# Patient Record
Sex: FEMALE | HISPANIC OR LATINO | URBAN - METROPOLITAN AREA
[De-identification: names, ages, dates, MRNs, and addresses within clinical notes are randomized per-mention and may not be internally consistent; named-entity substitution may affect disease eponyms.]

---

## 2018-06-07 NOTE — PATIENT DISCUSSION
F/u in 2-3 weeks; discussed that if no improvement, will likely need I&D on follow up. Pt declines I&D today.

## 2018-06-26 NOTE — PROCEDURE NOTE: CLINICAL
PROCEDURE NOTE: Chalazion Injection Left Lower Lid. Diagnosis: Chalazion. Prior to treatment, the risks/benefits/alternatives were discussed. The patient wished to proceed with procedure. Site of Injection: *. Kenalog *units injected. Patient tolerated procedure well. There were no complications. Post procedure instructions given. Cynthia Cutting PROCEDURE NOTE: Excision Chalazion, Single Left Lower Lid. Diagnosis: Chalazion. Prior to treatment, the risks/benefits/alternatives were discussed. The patient wished to proceed with procedure. After the risks, benefits, and alternatives to the procedure were discussed with the patient, informed consent was obtained. The patient, the procedure, and the correct site were identified beforehand. Local anesthesia was applied and the lid was prepped. The lid was clamped exposing the posterior surface on the eyelid. The chalazion was incised and removed with a curette. Bleeding was controlled and the clamp was removed. The patient tolerated the procedure well and left the room in good condition. Post procedure instructions given. Cynthia Cutting

## 2022-01-13 ENCOUNTER — IMPORTED ENCOUNTER (OUTPATIENT)
Dept: URBAN - METROPOLITAN AREA CLINIC 31 | Facility: CLINIC | Age: 72
End: 2022-01-13

## 2022-01-13 PROBLEM — H02.413: Noted: 2022-01-13

## 2022-01-13 PROBLEM — E11.9: Noted: 2022-01-13

## 2022-01-13 PROBLEM — H25.813: Noted: 2022-01-13

## 2022-01-13 PROCEDURE — 92015 DETERMINE REFRACTIVE STATE: CPT

## 2022-01-13 PROCEDURE — 99204 OFFICE O/P NEW MOD 45 MIN: CPT

## 2022-01-13 NOTE — PATIENT DISCUSSION
1. DM II without sign of Diabetic Retinopathy OU:  Discussed the pathophysiology of diabetes and its effect on the eye. Stressed the importance of regular followup and good control of BS BP and Lipids to avoid future complications. 2. Combined Types of Cataract OU: Explained how cataracts can effect vision. Recommend clinical observation. The patient was advised to contact us if any change or worsening of vision. 3. Mechanical Ptosis OU --  The patient has droopy upper eyelids. According to the patient and/or compared to old photographs of the patient the condition is old and stable. Therefore observation was recommended. 1 year CE

## 2022-04-02 ASSESSMENT — VISUAL ACUITY
OS_SC: 20/25
OS_CC: 20/80-2
OS_CC: J1+
OD_CC: J1+
OU_SC: J3
OD_SC: 20/25-2
OD_SC: J3
OU_CC: J1+16''
OD_CC: 20/100-2
OS_SC: J3

## 2022-04-02 ASSESSMENT — TONOMETRY
OD_IOP_MMHG: 14
OS_IOP_MMHG: 15

## 2022-07-09 ENCOUNTER — TELEPHONE ENCOUNTER (OUTPATIENT)
Dept: URBAN - METROPOLITAN AREA CLINIC 121 | Facility: CLINIC | Age: 72
End: 2022-07-09

## 2022-07-10 ENCOUNTER — TELEPHONE ENCOUNTER (OUTPATIENT)
Dept: URBAN - METROPOLITAN AREA CLINIC 121 | Facility: CLINIC | Age: 72
End: 2022-07-10

## 2022-07-10 RX ORDER — ALPRAZOLAM 1 MG/1
TABLET ORAL TWICE A DAY
Refills: 0 | Status: ACTIVE | COMMUNITY
Start: 2017-04-13

## 2022-07-10 RX ORDER — FAMOTIDINE 10 MG
TABLET ORAL
Refills: 0 | Status: ACTIVE | COMMUNITY
Start: 2017-04-13

## 2022-12-06 ENCOUNTER — P2P PATIENT RECORD (OUTPATIENT)
Age: 72
End: 2022-12-06

## 2022-12-07 ENCOUNTER — TELEPHONE ENCOUNTER (OUTPATIENT)
Dept: URBAN - METROPOLITAN AREA CLINIC 63 | Facility: CLINIC | Age: 72
End: 2022-12-07

## 2023-08-22 ENCOUNTER — OFFICE VISIT (OUTPATIENT)
Dept: URBAN - METROPOLITAN AREA CLINIC 60 | Facility: CLINIC | Age: 73
End: 2023-08-22
Payer: COMMERCIAL

## 2023-08-22 ENCOUNTER — LAB OUTSIDE AN ENCOUNTER (OUTPATIENT)
Dept: URBAN - METROPOLITAN AREA CLINIC 60 | Facility: CLINIC | Age: 73
End: 2023-08-22

## 2023-08-22 ENCOUNTER — WEB ENCOUNTER (OUTPATIENT)
Dept: URBAN - METROPOLITAN AREA CLINIC 60 | Facility: CLINIC | Age: 73
End: 2023-08-22

## 2023-08-22 VITALS
OXYGEN SATURATION: 95 % | WEIGHT: 133 LBS | DIASTOLIC BLOOD PRESSURE: 78 MMHG | HEART RATE: 59 BPM | SYSTOLIC BLOOD PRESSURE: 120 MMHG | BODY MASS INDEX: 25.11 KG/M2 | RESPIRATION RATE: 20 BRPM | HEIGHT: 61 IN | TEMPERATURE: 96.3 F

## 2023-08-22 DIAGNOSIS — R13.14 PHARYNGOESOPHAGEAL DYSPHAGIA: ICD-10-CM

## 2023-08-22 DIAGNOSIS — K29.60 REFLUX GASTRITIS: ICD-10-CM

## 2023-08-22 DIAGNOSIS — Z12.11 COLON CANCER SCREENING: ICD-10-CM

## 2023-08-22 PROBLEM — 40739000: Status: ACTIVE | Noted: 2023-08-22

## 2023-08-22 PROCEDURE — 99204 OFFICE O/P NEW MOD 45 MIN: CPT | Performed by: NURSE PRACTITIONER

## 2023-08-22 RX ORDER — ROSUVASTATIN CALCIUM 20 MG/1
1 TABLET TABLET, COATED ORAL ONCE A DAY
Status: ACTIVE | COMMUNITY

## 2023-08-22 RX ORDER — FAMOTIDINE 40 MG/1
1 TABLET AT BEDTIME AS NEEDED TABLET, FILM COATED ORAL ONCE A DAY
Qty: 30 | Refills: 2 | OUTPATIENT
Start: 2023-08-22

## 2023-08-22 RX ORDER — ALPRAZOLAM 1 MG/1
TABLET ORAL TWICE A DAY
Refills: 0 | Status: ACTIVE | COMMUNITY
Start: 2017-04-13

## 2023-08-22 RX ORDER — LISINOPRIL AND HYDROCHLOROTHIAZIDE 12.5; 2 MG/1; MG/1
TABLET ORAL
Qty: 90 TABLET | Refills: 1 | Status: ACTIVE | COMMUNITY

## 2023-08-22 NOTE — HPI-TODAY'S VISIT:
Patient is here today referred by her primary doctor for screening colonoscopy.  Patient never had colonoscopy done, denies any personal or family history of colorectal cancer, rectal bleeding, or change in her bowel habits, negative for weight loss. Positive for chronic constipation. Patient also complaining of having symptom of gastritis and reflux.  Her symptoms are chronic but now seems to be worse.  Patient will start on diet and treatment with famotidine 40 mg once a day, EGD, colonoscopy.

## 2023-08-29 ENCOUNTER — LAB OUTSIDE AN ENCOUNTER (OUTPATIENT)
Dept: URBAN - METROPOLITAN AREA CLINIC 60 | Facility: CLINIC | Age: 73
End: 2023-08-29

## 2023-10-13 ENCOUNTER — CLAIMS CREATED FROM THE CLAIM WINDOW (OUTPATIENT)
Dept: URBAN - METROPOLITAN AREA CLINIC 4 | Facility: CLINIC | Age: 73
End: 2023-10-13
Payer: COMMERCIAL

## 2023-10-13 ENCOUNTER — CLAIMS CREATED FROM THE CLAIM WINDOW (OUTPATIENT)
Dept: URBAN - METROPOLITAN AREA SURGERY CENTER 4 | Facility: SURGERY CENTER | Age: 73
End: 2023-10-13
Payer: COMMERCIAL

## 2023-10-13 DIAGNOSIS — K29.70 GASTRITIS WITHOUT BLEEDING, UNSPECIFIED CHRONICITY, UNSPECIFIED GASTRITIS TYPE: ICD-10-CM

## 2023-10-13 DIAGNOSIS — K22.89 OTHER SPECIFIED DISEASE OF ESOPHAGUS: ICD-10-CM

## 2023-10-13 DIAGNOSIS — K21.9 GASTRO-ESOPHAGEAL REFLUX DISEASE WITHOUT ESOPHAGITIS: ICD-10-CM

## 2023-10-13 DIAGNOSIS — K21.9 ESOPHAGEAL REFLUX: ICD-10-CM

## 2023-10-13 DIAGNOSIS — K31.89 OTHER DISEASES OF STOMACH AND DUODENUM: ICD-10-CM

## 2023-10-13 DIAGNOSIS — K31.7 GASTRIC POLYPS: ICD-10-CM

## 2023-10-13 DIAGNOSIS — Z12.11 COLON CANCER SCREENING (HIGH RISK): ICD-10-CM

## 2023-10-13 DIAGNOSIS — K31.7 POLYP OF STOMACH AND DUODENUM: ICD-10-CM

## 2023-10-13 DIAGNOSIS — K29.70 CHRONIC GASTRITIS: ICD-10-CM

## 2023-10-13 PROCEDURE — 43239 EGD BIOPSY SINGLE/MULTIPLE: CPT | Performed by: INTERNAL MEDICINE

## 2023-10-13 PROCEDURE — 88312 SPECIAL STAINS GROUP 1: CPT | Performed by: PATHOLOGY

## 2023-10-13 PROCEDURE — 88305 TISSUE EXAM BY PATHOLOGIST: CPT | Performed by: PATHOLOGY

## 2023-10-13 PROCEDURE — 00731 ANES UPR GI NDSC PX NOS: CPT | Performed by: NURSE ANESTHETIST, CERTIFIED REGISTERED

## 2023-10-13 PROCEDURE — 88342 IMHCHEM/IMCYTCHM 1ST ANTB: CPT | Performed by: PATHOLOGY

## 2023-10-13 PROCEDURE — 99100 ANES PT EXTEME AGE<1 YR&>70: CPT | Performed by: NURSE ANESTHETIST, CERTIFIED REGISTERED

## 2023-10-13 RX ORDER — ALPRAZOLAM 1 MG/1
TABLET ORAL TWICE A DAY
Refills: 0 | Status: ACTIVE | COMMUNITY
Start: 2017-04-13

## 2023-10-13 RX ORDER — ROSUVASTATIN CALCIUM 20 MG/1
1 TABLET TABLET, COATED ORAL ONCE A DAY
Status: ACTIVE | COMMUNITY

## 2023-10-13 RX ORDER — LISINOPRIL AND HYDROCHLOROTHIAZIDE 12.5; 2 MG/1; MG/1
TABLET ORAL
Qty: 90 TABLET | Refills: 1 | Status: ACTIVE | COMMUNITY

## 2023-10-13 RX ORDER — FAMOTIDINE 40 MG/1
1 TABLET AT BEDTIME AS NEEDED TABLET, FILM COATED ORAL ONCE A DAY
Qty: 30 | Refills: 2 | Status: ACTIVE | COMMUNITY
Start: 2023-08-22

## 2023-10-24 ENCOUNTER — OFFICE VISIT (OUTPATIENT)
Dept: URBAN - METROPOLITAN AREA CLINIC 60 | Facility: CLINIC | Age: 73
End: 2023-10-24

## 2023-10-24 RX ORDER — LISINOPRIL AND HYDROCHLOROTHIAZIDE 12.5; 2 MG/1; MG/1
TABLET ORAL
Qty: 90 TABLET | Refills: 1 | Status: ACTIVE | COMMUNITY

## 2023-10-24 RX ORDER — ALPRAZOLAM 1 MG/1
TABLET ORAL TWICE A DAY
Refills: 0 | Status: ACTIVE | COMMUNITY
Start: 2017-04-13

## 2023-10-24 RX ORDER — ROSUVASTATIN CALCIUM 20 MG/1
1 TABLET TABLET, COATED ORAL ONCE A DAY
Status: ACTIVE | COMMUNITY

## 2023-10-24 RX ORDER — FAMOTIDINE 40 MG/1
1 TABLET AT BEDTIME AS NEEDED TABLET, FILM COATED ORAL ONCE A DAY
Qty: 30 | Refills: 2 | Status: ACTIVE | COMMUNITY
Start: 2023-08-22

## 2024-01-18 ENCOUNTER — LAB OUTSIDE AN ENCOUNTER (OUTPATIENT)
Dept: URBAN - METROPOLITAN AREA SURGERY CENTER 4 | Facility: SURGERY CENTER | Age: 74
End: 2024-01-18

## 2024-01-19 ENCOUNTER — OFFICE VISIT (OUTPATIENT)
Dept: URBAN - METROPOLITAN AREA SURGERY CENTER 4 | Facility: SURGERY CENTER | Age: 74
End: 2024-01-19
Payer: COMMERCIAL

## 2024-01-19 ENCOUNTER — TELEPHONE ENCOUNTER (OUTPATIENT)
Dept: URBAN - METROPOLITAN AREA CLINIC 63 | Facility: CLINIC | Age: 74
End: 2024-01-19

## 2024-01-19 ENCOUNTER — CLAIMS CREATED FROM THE CLAIM WINDOW (OUTPATIENT)
Dept: URBAN - METROPOLITAN AREA CLINIC 4 | Facility: CLINIC | Age: 74
End: 2024-01-19
Payer: COMMERCIAL

## 2024-01-19 DIAGNOSIS — Z12.11 ENCOUNTER FOR SCREENING FOR MALIGNANT NEOPLASM OF COLON: ICD-10-CM

## 2024-01-19 DIAGNOSIS — D17.5 LIPOMA OF COLON: ICD-10-CM

## 2024-01-19 DIAGNOSIS — Z87.19 PERSONAL HISTORY OF OTHER DISEASES OF THE DIGESTIVE SYSTEM: ICD-10-CM

## 2024-01-19 DIAGNOSIS — K64.1 SECOND DEGREE HEMORRHOIDS: ICD-10-CM

## 2024-01-19 DIAGNOSIS — Z12.11 COLON CANCER SCREENING (HIGH RISK): ICD-10-CM

## 2024-01-19 DIAGNOSIS — K63.5 POLYP OF TRANSVERSE COLON, UNSPECIFIED TYPE: ICD-10-CM

## 2024-01-19 DIAGNOSIS — K62.1 RECTAL POLYP: ICD-10-CM

## 2024-01-19 DIAGNOSIS — Z86.010 ADENOMAS PERSONAL HISTORY OF COLONIC POLYPS: ICD-10-CM

## 2024-01-19 DIAGNOSIS — K63.5 BENIGN COLON POLYPS: ICD-10-CM

## 2024-01-19 DIAGNOSIS — K63.89 OTHER SPECIFIED DISEASES OF INTESTINE: ICD-10-CM

## 2024-01-19 PROCEDURE — 45380 COLONOSCOPY AND BIOPSY: CPT | Performed by: INTERNAL MEDICINE

## 2024-01-19 PROCEDURE — 99100 ANES PT EXTEME AGE<1 YR&>70: CPT | Performed by: NURSE ANESTHETIST, CERTIFIED REGISTERED

## 2024-01-19 PROCEDURE — 88305 TISSUE EXAM BY PATHOLOGIST: CPT | Performed by: PATHOLOGY

## 2024-01-19 PROCEDURE — 00811 ANES LWR INTST NDSC NOS: CPT | Performed by: NURSE ANESTHETIST, CERTIFIED REGISTERED

## 2024-01-19 RX ORDER — FAMOTIDINE 40 MG/1
1 TABLET AT BEDTIME AS NEEDED TABLET, FILM COATED ORAL ONCE A DAY
Qty: 30 | Refills: 2 | Status: ACTIVE | COMMUNITY
Start: 2023-08-22

## 2024-01-19 RX ORDER — ALPRAZOLAM 1 MG/1
TABLET ORAL TWICE A DAY
Refills: 0 | Status: ACTIVE | COMMUNITY
Start: 2017-04-13

## 2024-01-19 RX ORDER — LISINOPRIL AND HYDROCHLOROTHIAZIDE 12.5; 2 MG/1; MG/1
TABLET ORAL
Qty: 90 TABLET | Refills: 1 | Status: ACTIVE | COMMUNITY

## 2024-01-19 RX ORDER — ROSUVASTATIN CALCIUM 20 MG/1
1 TABLET TABLET, COATED ORAL ONCE A DAY
Status: ACTIVE | COMMUNITY

## 2024-01-26 ENCOUNTER — TELEPHONE ENCOUNTER (OUTPATIENT)
Dept: URBAN - METROPOLITAN AREA CLINIC 64 | Facility: CLINIC | Age: 74
End: 2024-01-26

## 2024-01-30 ENCOUNTER — ERX REFILL RESPONSE (OUTPATIENT)
Dept: URBAN - METROPOLITAN AREA CLINIC 63 | Facility: CLINIC | Age: 74
End: 2024-01-30

## 2024-01-30 ENCOUNTER — OFFICE VISIT (OUTPATIENT)
Dept: URBAN - METROPOLITAN AREA CLINIC 63 | Facility: CLINIC | Age: 74
End: 2024-01-30
Payer: COMMERCIAL

## 2024-01-30 VITALS
DIASTOLIC BLOOD PRESSURE: 60 MMHG | SYSTOLIC BLOOD PRESSURE: 110 MMHG | OXYGEN SATURATION: 93 % | HEIGHT: 61 IN | HEART RATE: 64 BPM | TEMPERATURE: 93.4 F | WEIGHT: 135 LBS | BODY MASS INDEX: 25.49 KG/M2

## 2024-01-30 DIAGNOSIS — A04.8 H. PYLORI INFECTION: ICD-10-CM

## 2024-01-30 DIAGNOSIS — K63.5 POLYP OF COLON, UNSPECIFIED PART OF COLON, UNSPECIFIED TYPE: ICD-10-CM

## 2024-01-30 DIAGNOSIS — K64.8 INTERNAL HEMORRHOIDS: ICD-10-CM

## 2024-01-30 DIAGNOSIS — K29.60 REFLUX GASTRITIS: ICD-10-CM

## 2024-01-30 PROCEDURE — 99214 OFFICE O/P EST MOD 30 MIN: CPT | Performed by: INTERNAL MEDICINE

## 2024-01-30 RX ORDER — OMEPRAZOLE 20 MG/1
1 CAPSULE 30 MINUTES BEFORE MORNING MEAL AND BEFORE DINNER CAPSULE, DELAYED RELEASE ORAL TWICE A DAY
Qty: 60 | Refills: 1 | OUTPATIENT

## 2024-01-30 RX ORDER — ALPRAZOLAM 1 MG/1
TABLET ORAL TWICE A DAY
Refills: 0 | Status: ACTIVE | COMMUNITY
Start: 2017-04-13

## 2024-01-30 RX ORDER — CLARITHROMYCIN 500 MG/1
1 TABLET TABLET, FILM COATED ORAL
Qty: 28 TABLET | OUTPATIENT
Start: 2024-01-30 | End: 2024-02-13

## 2024-01-30 RX ORDER — OMEPRAZOLE 20 MG/1
1 CAPSULE 30 MINUTES BEFORE MORNING MEAL CAPSULE, DELAYED RELEASE ORAL TWICE A DAY
Qty: 28 CAPSULE | OUTPATIENT

## 2024-01-30 RX ORDER — AMOXICILLIN 500 MG/1
2 CAPSULES CAPSULE ORAL TWICE A DAY
Qty: 56 CAPSULE | OUTPATIENT
Start: 2024-01-30 | End: 2024-02-13

## 2024-01-30 RX ORDER — OMEPRAZOLE 20 MG/1
1 CAPSULE 30 MINUTES BEFORE MORNING MEAL CAPSULE, DELAYED RELEASE ORAL TWICE A DAY
Qty: 28 CAPSULE | OUTPATIENT
Start: 2024-01-30

## 2024-01-30 RX ORDER — LISINOPRIL AND HYDROCHLOROTHIAZIDE 12.5; 2 MG/1; MG/1
TABLET ORAL
Qty: 90 TABLET | Refills: 1 | Status: ACTIVE | COMMUNITY

## 2024-01-30 RX ORDER — ROSUVASTATIN CALCIUM 20 MG/1
1 TABLET TABLET, COATED ORAL ONCE A DAY
Status: ACTIVE | COMMUNITY

## 2024-01-30 RX ORDER — FAMOTIDINE 40 MG/1
1 TABLET AT BEDTIME AS NEEDED TABLET, FILM COATED ORAL ONCE A DAY
Qty: 30 | Refills: 2 | Status: ACTIVE | COMMUNITY
Start: 2023-08-22

## 2024-01-30 NOTE — HPI-TODAY'S VISIT:
Patient is here today referred by her primary doctor for screening colonoscopy.  Patient never had colonoscopy done, denies any personal or family history of colorectal cancer, rectal bleeding, or change in her bowel habits, negative for weight loss. Positive for chronic constipation. Patient also complaining of having symptom of gastritis and reflux.  Her symptoms are chronic but now seems to be worse.  Patient will start on diet and treatment with famotidine 40 mg once a day, EGD, colonoscopy. 1/24: Patient had an upper endoscopy and colonoscopy in October 2023.  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  Colonoscopy with colonic polyps 5 mm polyp in the transverse colon 5 mm polyp in the descending colon and a 5 mm polyp in the rectum all of them removed with cold biopsy forcep, medium sized lipoma in the transverse colon and internal hemorrhoids pathology report showed normal duodenum with chronic gastritis positive for H. pylori distal esophagus showed squamous columnar mucosa with reflux type negative for Chaidez's esophagus, negative for dysplasia or malignancy.  Pathology from the polyps removed all of them were hyperplastic and benign polyps was recommended to repeat a colonoscopy in 5 years.  Will do triple treatment and will follow the patient in 2 months and will check the status of H. pylori.

## 2024-04-03 ENCOUNTER — OV EP (OUTPATIENT)
Dept: URBAN - METROPOLITAN AREA CLINIC 63 | Facility: CLINIC | Age: 74
End: 2024-04-03
Payer: COMMERCIAL

## 2024-04-03 VITALS
BODY MASS INDEX: 25.3 KG/M2 | SYSTOLIC BLOOD PRESSURE: 110 MMHG | WEIGHT: 134 LBS | OXYGEN SATURATION: 92 % | HEIGHT: 61 IN | HEART RATE: 61 BPM | DIASTOLIC BLOOD PRESSURE: 80 MMHG | TEMPERATURE: 96.1 F

## 2024-04-03 DIAGNOSIS — K59.09 CHRONIC CONSTIPATION: ICD-10-CM

## 2024-04-03 DIAGNOSIS — A04.8 H. PYLORI INFECTION: ICD-10-CM

## 2024-04-03 DIAGNOSIS — Z86.010 PERSONAL HISTORY OF COLONIC POLYPS: ICD-10-CM

## 2024-04-03 DIAGNOSIS — K29.60 REFLUX GASTRITIS: ICD-10-CM

## 2024-04-03 PROCEDURE — 99214 OFFICE O/P EST MOD 30 MIN: CPT | Performed by: INTERNAL MEDICINE

## 2024-04-03 RX ORDER — LISINOPRIL AND HYDROCHLOROTHIAZIDE 12.5; 2 MG/1; MG/1
TABLET ORAL
Qty: 90 TABLET | Refills: 1 | Status: ACTIVE | COMMUNITY

## 2024-04-03 RX ORDER — ROSUVASTATIN CALCIUM 20 MG/1
1 TABLET TABLET, COATED ORAL ONCE A DAY
Status: ACTIVE | COMMUNITY

## 2024-04-03 RX ORDER — LINACLOTIDE 72 UG/1
1 CAPSULE AT LEAST 30 MINUTES BEFORE THE FIRST MEAL OF THE DAY ON AN EMPTY STOMACH CAPSULE, GELATIN COATED ORAL ONCE A DAY
Qty: 30 | OUTPATIENT
Start: 2024-04-03 | End: 2024-05-03

## 2024-04-03 RX ORDER — OMEPRAZOLE 20 MG/1
1 CAPSULE 30 MINUTES BEFORE MORNING MEAL AND BEFORE DINNER ORALLY TWICE A DAY 30 DAYS CAPSULE, DELAYED RELEASE ORAL
Qty: 180 CAPSULE | Refills: 1 | Status: ACTIVE | COMMUNITY

## 2024-04-03 RX ORDER — FAMOTIDINE 40 MG/1
1 TABLET AT BEDTIME AS NEEDED TABLET, FILM COATED ORAL ONCE A DAY
Qty: 30 | Refills: 2 | Status: ACTIVE | COMMUNITY
Start: 2023-08-22

## 2024-04-03 RX ORDER — ALPRAZOLAM 1 MG/1
TABLET ORAL TWICE A DAY
Refills: 0 | Status: ACTIVE | COMMUNITY
Start: 2017-04-13

## 2024-04-03 NOTE — HPI-TODAY'S VISIT:
Patient is here today referred by her primary doctor for screening colonoscopy.  Patient never had colonoscopy done, denies any personal or family history of colorectal cancer, rectal bleeding, or change in her bowel habits, negative for weight loss. Positive for chronic constipation. Patient also complaining of having symptom of gastritis and reflux.  Her symptoms are chronic but now seems to be worse.  Patient will start on diet and treatment with famotidine 40 mg once a day, EGD, colonoscopy. 1/24: Patient had an upper endoscopy and colonoscopy in October 2023.  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  Colonoscopy with colonic polyps 5 mm polyp in the transverse colon 5 mm polyp in the descending colon and a 5 mm polyp in the rectum all of them removed with cold biopsy forcep, medium sized lipoma in the transverse colon and internal hemorrhoids pathology report showed normal duodenum with chronic gastritis positive for H. pylori distal esophagus showed squamous columnar mucosa with reflux type negative for Chaidez's esophagus, negative for dysplasia or malignancy.  Pathology from the polyps removed all of them were hyperplastic and benign polyps was recommended to repeat a colonoscopy in 5 years.  Will do triple treatment and will follow the patient in 2 months and will check the status of H. pylori. 4/24; Patient is being seen after treatment for H. pylori, patient tolerated the treatment well, will check status of H. pylori we will do urea breath test, patient will call back for results, if they are positive we will need to see her back at the office to adjust treatment.  Otherwise we will follow-up as a scheduled in the past.  She will need to have a repeat colonoscopy in 5 years.  And has per distal esophagus was evidence of acid reflux negative for Chaidez's esophagus, at that time we could repeat an endoscopy if there is any upper GI symptoms.

## 2024-04-03 NOTE — PHYSICAL EXAM GASTROINTESTINAL
Abdomen , soft, nontender, nondistended , no guarding or rigidity , no masses palpable , normal bowel sounds , Liver and Spleen,  no hepatosplenomegaly , liver nontender Natalie DC. Request already responded to by other means (e.g. phone or fax)   Refill Authorization Note   Sheri Broderick  is requesting a refill authorization.  Brief Assessment and Rationale for Refill:  Quick Discontinue  Medication Therapy Plan:       Medication Reconciliation Completed:  No      Comments:     Note composed:11:38 AM 05/16/2022

## 2024-07-31 ENCOUNTER — DASHBOARD ENCOUNTERS (OUTPATIENT)
Age: 74
End: 2024-07-31

## 2024-07-31 ENCOUNTER — OFFICE VISIT (OUTPATIENT)
Dept: URBAN - METROPOLITAN AREA CLINIC 63 | Facility: CLINIC | Age: 74
End: 2024-07-31

## 2024-07-31 VITALS
SYSTOLIC BLOOD PRESSURE: 142 MMHG | WEIGHT: 132 LBS | TEMPERATURE: 97.3 F | OXYGEN SATURATION: 92 % | HEART RATE: 66 BPM | HEIGHT: 61 IN | BODY MASS INDEX: 24.92 KG/M2 | DIASTOLIC BLOOD PRESSURE: 90 MMHG

## 2024-07-31 RX ORDER — FAMOTIDINE 40 MG/1
1 TABLET AT BEDTIME AS NEEDED TABLET, FILM COATED ORAL ONCE A DAY
Qty: 30 | Refills: 2 | Status: ACTIVE | COMMUNITY
Start: 2023-08-22

## 2024-07-31 RX ORDER — LISINOPRIL AND HYDROCHLOROTHIAZIDE 12.5; 2 MG/1; MG/1
TABLET ORAL
Qty: 90 TABLET | Refills: 1 | Status: ACTIVE | COMMUNITY

## 2024-07-31 RX ORDER — OMEPRAZOLE 20 MG/1
1 CAPSULE 30 MINUTES BEFORE MORNING MEAL AND BEFORE DINNER ORALLY TWICE A DAY 30 DAYS CAPSULE, DELAYED RELEASE ORAL
Qty: 180 CAPSULE | Refills: 1 | Status: ACTIVE | COMMUNITY

## 2024-07-31 RX ORDER — ROSUVASTATIN CALCIUM 20 MG/1
1 TABLET TABLET, COATED ORAL ONCE A DAY
Status: ACTIVE | COMMUNITY

## 2024-07-31 RX ORDER — ALPRAZOLAM 1 MG/1
TABLET ORAL TWICE A DAY
Refills: 0 | Status: ACTIVE | COMMUNITY
Start: 2017-04-13

## 2024-07-31 NOTE — HPI-TODAY'S VISIT:
Patient is here today referred by her primary doctor for screening colonoscopy.  Patient never had colonoscopy done, denies any personal or family history of colorectal cancer, rectal bleeding, or change in her bowel habits, negative for weight loss. Positive for chronic constipation. Patient also complaining of having symptom of gastritis and reflux.  Her symptoms are chronic but now seems to be worse.  Patient will start on diet and treatment with famotidine 40 mg once a day, EGD, colonoscopy. : Patient had an upper endoscopy and colonoscopy in 2023.  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  Colonoscopy with colonic polyps 5 mm polyp in the transverse colon 5 mm polyp in the descending colon and a 5 mm polyp in the rectum all of them removed with cold biopsy forcep, medium sized lipoma in the transverse colon and internal hemorrhoids pathology report showed normal duodenum with chronic gastritis positive for H. pylori distal esophagus showed squamous columnar mucosa with reflux type negative for Chaidez's esophagus, negative for dysplasia or malignancy.  Pathology from the polyps removed all of them were hyperplastic and benign polyps was recommended to repeat a colonoscopy in 5 years.  Will do triple treatment and will follow the patient in 2 months and will check the status of H. pylori. ; Patient is being seen after treatment for H. pylori, patient tolerated the treatment well, will check status of H. pylori we will do urea breath test, patient will call back for results, if they are positive we will need to see her back at the office to adjust treatment.  Otherwise we will follow-up as a scheduled in the past.  She will need to have a repeat colonoscopy in 5 years.  And has per distal esophagus was evidence of acid reflux negative for Chaidez's esophagus, at that time we could repeat an endoscopy if there is any upper GI symptoms. : Patient had recent labs with evidence of elevated T  and gucose. A1C is 6.9. LFT are normal. Patient with  constipation will start with miralax  daily and will do urea breath test and will follow in 4 months.

## 2024-12-11 ENCOUNTER — OFFICE VISIT (OUTPATIENT)
Dept: URBAN - METROPOLITAN AREA CLINIC 63 | Facility: CLINIC | Age: 74
End: 2024-12-11
Payer: COMMERCIAL

## 2024-12-11 VITALS
SYSTOLIC BLOOD PRESSURE: 144 MMHG | TEMPERATURE: 97.3 F | WEIGHT: 137 LBS | BODY MASS INDEX: 25.86 KG/M2 | DIASTOLIC BLOOD PRESSURE: 87 MMHG | HEIGHT: 61 IN | HEART RATE: 70 BPM | OXYGEN SATURATION: 98 %

## 2024-12-11 DIAGNOSIS — K29.60 REFLUX GASTRITIS: ICD-10-CM

## 2024-12-11 DIAGNOSIS — E11.9 TYPE 2 DIABETES MELLITUS WITHOUT COMPLICATION, WITHOUT LONG-TERM CURRENT USE OF INSULIN: ICD-10-CM

## 2024-12-11 DIAGNOSIS — K59.09 CHRONIC CONSTIPATION: ICD-10-CM

## 2024-12-11 DIAGNOSIS — G62.9 NEUROPATHY: ICD-10-CM

## 2024-12-11 DIAGNOSIS — A04.8 H. PYLORI INFECTION: ICD-10-CM

## 2024-12-11 PROBLEM — 313436004: Status: ACTIVE | Noted: 2024-12-11

## 2024-12-11 PROCEDURE — 99214 OFFICE O/P EST MOD 30 MIN: CPT | Performed by: INTERNAL MEDICINE

## 2024-12-11 RX ORDER — OMEPRAZOLE 20 MG/1
1 CAPSULE 30 MINUTES BEFORE MORNING MEAL AND BEFORE DINNER ORALLY TWICE A DAY 30 DAYS CAPSULE, DELAYED RELEASE ORAL
Qty: 180 CAPSULE | Refills: 1 | Status: ACTIVE | COMMUNITY

## 2024-12-11 RX ORDER — GABAPENTIN 300 MG/1
CAPSULE ORAL
Qty: 90 APPLICATOR | Refills: 1 | Status: ACTIVE | COMMUNITY

## 2024-12-11 RX ORDER — LISINOPRIL AND HYDROCHLOROTHIAZIDE 12.5; 2 MG/1; MG/1
TABLET ORAL
Qty: 90 TABLET | Refills: 1 | Status: ACTIVE | COMMUNITY

## 2024-12-11 RX ORDER — ALPRAZOLAM 1 MG/1
TABLET ORAL TWICE A DAY
Refills: 0 | Status: ACTIVE | COMMUNITY
Start: 2017-04-13

## 2024-12-11 RX ORDER — FAMOTIDINE 40 MG/1
1 TABLET AT BEDTIME AS NEEDED TABLET, FILM COATED ORAL ONCE A DAY
Qty: 30 | Refills: 2 | Status: ACTIVE | COMMUNITY
Start: 2023-08-22

## 2024-12-11 RX ORDER — ROSUVASTATIN CALCIUM 20 MG/1
1 TABLET TABLET, COATED ORAL ONCE A DAY
Status: ACTIVE | COMMUNITY

## 2024-12-11 RX ORDER — GABAPENTIN 300 MG/1
CAPSULE ORAL
Qty: 90 APPLICATOR | Refills: 1

## 2024-12-11 NOTE — HPI-TODAY'S VISIT:
Patient is here today referred by her primary doctor for screening colonoscopy.  Patient never had colonoscopy done, denies any personal or family history of colorectal cancer, rectal bleeding, or change in her bowel habits, negative for weight loss. Positive for chronic constipation. Patient also complaining of having symptom of gastritis and reflux.  Her symptoms are chronic but now seems to be worse.  Patient will start on diet and treatment with famotidine 40 mg once a day, EGD, colonoscopy. : Patient had an upper endoscopy and colonoscopy in 2023.  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  Colonoscopy with colonic polyps 5 mm polyp in the transverse colon 5 mm polyp in the descending colon and a 5 mm polyp in the rectum all of them removed with cold biopsy forcep, medium sized lipoma in the transverse colon and internal hemorrhoids pathology report showed normal duodenum with chronic gastritis positive for H. pylori distal esophagus showed squamous columnar mucosa with reflux type negative for Chaidez's esophagus, negative for dysplasia or malignancy.  Pathology from the polyps removed all of them were hyperplastic and benign polyps was recommended to repeat a colonoscopy in 5 years.  Will do triple treatment and will follow the patient in 2 months and will check the status of H. pylori. ; Patient is being seen after treatment for H. pylori, patient tolerated the treatment well, will check status of H. pylori we will do urea breath test, patient will call back for results, if they are positive we will need to see her back at the office to adjust treatment.  Otherwise we will follow-up as a scheduled in the past.  She will need to have a repeat colonoscopy in 5 years.  And has per distal esophagus was evidence of acid reflux negative for Chaidez's esophagus, at that time we could repeat an endoscopy if there is any upper GI symptoms. : Patient had recent labs with evidence of elevated T  and gucose. A1C is 6.9. LFT are normal. Patient with  constipation will start with miralax  daily and will do urea breath test and will follow in 4 months. : Patient with dyslipidemia, with elevated A1c will need new labs, Patient does not have a PCP, will order labas. Patient doing well without treatment with PPI.  Will do urea breath test. Will follow in 4 months.

## 2025-05-12 ENCOUNTER — OFFICE VISIT (OUTPATIENT)
Dept: URBAN - METROPOLITAN AREA CLINIC 63 | Facility: CLINIC | Age: 75
End: 2025-05-12
Payer: COMMERCIAL

## 2025-05-12 DIAGNOSIS — E11.9 TYPE 2 DIABETES MELLITUS WITHOUT COMPLICATION, WITHOUT LONG-TERM CURRENT USE OF INSULIN: ICD-10-CM

## 2025-05-12 DIAGNOSIS — R79.89 ELEVATED TSH: ICD-10-CM

## 2025-05-12 DIAGNOSIS — K29.60 REFLUX GASTRITIS: ICD-10-CM

## 2025-05-12 DIAGNOSIS — Z12.11 COLON CANCER SCREENING: ICD-10-CM

## 2025-05-12 PROCEDURE — 99214 OFFICE O/P EST MOD 30 MIN: CPT | Performed by: INTERNAL MEDICINE

## 2025-05-12 RX ORDER — OMEPRAZOLE 20 MG/1
1 CAPSULE 30 MINUTES BEFORE MORNING MEAL AND BEFORE DINNER ORALLY TWICE A DAY 30 DAYS CAPSULE, DELAYED RELEASE ORAL
Qty: 180 CAPSULE | Refills: 1 | Status: ACTIVE | COMMUNITY

## 2025-05-12 RX ORDER — LISINOPRIL AND HYDROCHLOROTHIAZIDE 12.5; 2 MG/1; MG/1
TABLET ORAL
Qty: 90 TABLET | Refills: 1 | Status: ACTIVE | COMMUNITY

## 2025-05-12 RX ORDER — GABAPENTIN 300 MG/1
CAPSULE ORAL
Qty: 90 APPLICATOR | Refills: 1 | Status: ACTIVE | COMMUNITY

## 2025-05-12 RX ORDER — ROSUVASTATIN CALCIUM 20 MG/1
1 TABLET TABLET, COATED ORAL ONCE A DAY
Status: ACTIVE | COMMUNITY

## 2025-05-12 RX ORDER — ALPRAZOLAM 1 MG/1
TABLET ORAL TWICE A DAY
Refills: 0 | Status: ACTIVE | COMMUNITY
Start: 2017-04-13

## 2025-05-12 NOTE — HPI-TODAY'S VISIT:
Patient is here today referred by her primary doctor for screening colonoscopy.  Patient never had colonoscopy done, denies any personal or family history of colorectal cancer, rectal bleeding, or change in her bowel habits, negative for weight loss. Positive for chronic constipation. Patient also complaining of having symptom of gastritis and reflux.  Her symptoms are chronic but now seems to be worse.  Patient will start on diet and treatment with famotidine 40 mg once a day, EGD, colonoscopy. : Patient had an upper endoscopy and colonoscopy in 2023.  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  Colonoscopy with colonic polyps 5 mm polyp in the transverse colon 5 mm polyp in the descending colon and a 5 mm polyp in the rectum all of them removed with cold biopsy forcep, medium sized lipoma in the transverse colon and internal hemorrhoids pathology report showed normal duodenum with chronic gastritis positive for H. pylori distal esophagus showed squamous columnar mucosa with reflux type negative for Chaidez's esophagus, negative for dysplasia or malignancy.  Pathology from the polyps removed all of them were hyperplastic and benign polyps was recommended to repeat a colonoscopy in 5 years.  Will do triple treatment and will follow the patient in 2 months and will check the status of H. pylori. ; Patient is being seen after treatment for H. pylori, patient tolerated the treatment well, will check status of H. pylori we will do urea breath test, patient will call back for results, if they are positive we will need to see her back at the office to adjust treatment.  Otherwise we will follow-up as a scheduled in the past.  She will need to have a repeat colonoscopy in 5 years.  And has per distal esophagus was evidence of acid reflux negative for Chaidez's esophagus, at that time we could repeat an endoscopy if there is any upper GI symptoms. : Patient had recent labs with evidence of elevated T  and gucose. A1C is 6.9. LFT are normal. Patient with  constipation will start with miralax  daily and will do urea breath test and will follow in 4 months. : Patient with dyslipidemia, with elevated A1c will need new labs, Patient does not have a PCP, will order labas. Patient doing well without treatment with PPI.  Will do urea breath test. Will follow in 4 months. : Patient had recent test , H pylori is negative in urea breath test. Patient TSH: 4.88 Hb A1c: 7.1, CMP is normal except ALT 36, CBC is normal and Lipid panel is normal only HDL is 47 ( low) Will need close follow up with her PCP.  Will follow in 6 months